# Patient Record
(demographics unavailable — no encounter records)

---

## 2025-03-07 NOTE — PHYSICAL EXAM
[Well Developed] : well developed [Well Nourished] : well nourished [No Acute Distress] : no acute distress [Normal Conjunctiva] : normal conjunctiva [Normal Venous Pressure] : normal venous pressure [No Carotid Bruit] : no carotid bruit [Normal Rate] : normal [Rhythm Regular] : regular [Normal S1] : normal S1 [Normal S2] : normal S2 [I] : a grade 1 [No Pitting Edema] : no pitting edema present [2+] : left 2+ [Right Carotid Bruit] : no bruit heard over the right carotid [Left Carotid Bruit] : no bruit heard over the left carotid [Clear Lung Fields] : clear lung fields [Good Air Entry] : good air entry [No Respiratory Distress] : no respiratory distress  [Soft] : abdomen soft [Non Tender] : non-tender [No Masses/organomegaly] : no masses/organomegaly [Normal Bowel Sounds] : normal bowel sounds [Normal Gait] : normal gait [No Edema] : no edema [No Cyanosis] : no cyanosis [No Clubbing] : no clubbing [No Varicosities] : no varicosities [No Rash] : no rash [No Skin Lesions] : no skin lesions [Moves all extremities] : moves all extremities [No Focal Deficits] : no focal deficits [Normal Speech] : normal speech [Alert and Oriented] : alert and oriented [Normal memory] : normal memory

## 2025-03-07 NOTE — DISCUSSION/SUMMARY
[FreeTextEntry1] : 73 year man with a history as listed presents for a followup cardiac evaluation.  Riri is doing. He denies any anginal symptoms. Clinically he is euvolemic on exam. His EKG did not reveal any significant ischemic changes. He is s/p multivessel PCI. He has stopped ASA and continue Plavix.  His blood pressure and heart rate are controlled. His bradycardia and low BP precludes BB therapy.  He will continue with statin therapy to achieve maintain goal LDL<100 or ideally <70.  he has mild dizziness that is likely from the Flomax.  At your convenience, please fax me his latest lab results including lipid profile.  Exercise and diet counseling was performed in order to reduce her future cardiovascular risk.  He will followup with me in 6 months or sooner if necessary.  [EKG obtained to assist in diagnosis and management of assessed problem(s)] : EKG obtained to assist in diagnosis and management of assessed problem(s)

## 2025-03-07 NOTE — CARDIOLOGY SUMMARY
[de-identified] : SR [de-identified] : 5/2023 normal LV function.  [de-identified] : 6/2023 s/p PCI LAD and RCA LAD Proximal left anterior descending: There is a 20 % stenosis. Mid left anterior descending: There is a 70 % stenosis. Instant wave-free ratio was performed with a calculated value of 0.87. Based on the results, the lesion was judged to be significant and an intervention was performed.  CX Proximal circumflex: There is a 50 % stenosis. Mid circumflex: There is a 95 % stenosis. RCA Proximal right coronary artery: There is a 90 % stenosis. Distal right coronary artery: There is a 90 % stenosis. Right posterior descending artery: There is a 90 % stenosis in the ostium portion of the segment.

## 2025-03-07 NOTE — HISTORY OF PRESENT ILLNESS
[FreeTextEntry1] : 73 year old man with a history of CAD s/p PCI to LAD and RCA, HLD, BPH presents for a follow-up   Since his last visit he has been feeling well. He is exercising with walking about 2.5 miles a day without any anginal symptoms. His CHOPRA has resolved.  He   denies any chest pain, PND, orthopnea, lower extremity edema, near syncope, syncope, strokelike symptoms. Medication reconciliation performed. He is compliant with his medications.

## 2025-06-02 NOTE — HISTORY OF PRESENT ILLNESS
[FreeTextEntry1] : Riri is a 73-year-old male who presents to our office today for continued care of thick, hard toenails he cannot cut and thick, hard, dry, scaly skin on both feet.  He has been going to the nail salon, but even the ladies there cannot cut them.  He admits to not using his lotion.   Mycotic Nail Pain: In shoe gear and on palpation.

## 2025-06-02 NOTE — PROCEDURE
[FreeTextEntry1] : Examination.  (Nt=22036).  Surgical debridement of the affected skin area.  We demonstrated how to apply lotion and made OTC recommendations.  Right 1st toenail, right 2nd toenail, right 3rd toenail, right 4th toenail, right 5th toenail, left 1st toenail, left 2nd toenail, left 3rd toenail, left 4th toenail and left 5th toenail.   Fungal nails were debrided using manual cutters and electrical  to patient tolerance. (Kt=[id number]).  Patient to applying lotion daily Patient to return: 1 Month

## 2025-06-02 NOTE — PHYSICAL EXAM
[General Appearance - Alert] : alert [General Appearance - Well Nourished] : well nourished [General Appearance - Well-Appearing] : healthy appearing [FreeTextEntry3] : Vascular Exam: DP Pulse (left): 1/4. DP Pulse (right): 1/4. PT Pulse (left): 1/4. PT Pulse (right): 1/4. Capillary Return - L: Instantaneous. Capillary Return - R: Instantaneous. Temperature Grad - L: Warm to Cool. Temperature Grad - R: Warm to Cool.  [de-identified] : Orthopedic Exam: Bunion feet bilateral. Hammertoe feet bilateral, No pain upon examination at this time.    [FreeTextEntry1] : Neurological Exam: Light Touch/pressure - L: WNL. Light Touch/pressure - R: WNL. Hot/cold - L: WNL. Hot/cold - R: WNL.